# Patient Record
Sex: FEMALE
[De-identification: names, ages, dates, MRNs, and addresses within clinical notes are randomized per-mention and may not be internally consistent; named-entity substitution may affect disease eponyms.]

---

## 2017-05-08 ENCOUNTER — CHARTING TRANS (OUTPATIENT)
Dept: OTHER | Age: 32
End: 2017-05-08

## 2018-11-03 VITALS
OXYGEN SATURATION: 98 % | DIASTOLIC BLOOD PRESSURE: 64 MMHG | WEIGHT: 128.06 LBS | RESPIRATION RATE: 16 BRPM | HEIGHT: 62 IN | SYSTOLIC BLOOD PRESSURE: 102 MMHG | BODY MASS INDEX: 23.57 KG/M2 | TEMPERATURE: 98.1 F | HEART RATE: 78 BPM

## 2022-03-04 PROBLEM — F41.1 GAD (GENERALIZED ANXIETY DISORDER): Status: ACTIVE | Noted: 2022-03-04

## 2024-10-17 ENCOUNTER — WALK IN (OUTPATIENT)
Dept: URGENT CARE | Age: 39
End: 2024-10-17
Attending: EMERGENCY MEDICINE

## 2024-10-17 VITALS
RESPIRATION RATE: 16 BRPM | OXYGEN SATURATION: 96 % | DIASTOLIC BLOOD PRESSURE: 68 MMHG | HEART RATE: 86 BPM | SYSTOLIC BLOOD PRESSURE: 101 MMHG | TEMPERATURE: 98.2 F

## 2024-10-17 DIAGNOSIS — J02.9 PHARYNGITIS, UNSPECIFIED ETIOLOGY: Primary | ICD-10-CM

## 2024-10-17 RX ORDER — MINOXIDIL 2.5 MG/1
TABLET ORAL
COMMUNITY
Start: 2024-08-15

## 2024-10-17 RX ORDER — METHYLPREDNISOLONE 4 MG/1
4 TABLET ORAL SEE ADMIN INSTRUCTIONS
Qty: 21 TABLET | Refills: 0 | Status: SHIPPED | OUTPATIENT
Start: 2024-10-17

## 2024-10-17 ASSESSMENT — ENCOUNTER SYMPTOMS
SHORTNESS OF BREATH: 0
COUGH: 0
SORE THROAT: 1
FEVER: 0

## 2024-10-17 ASSESSMENT — PAIN SCALES - GENERAL: PAINLEVEL: 2

## 2025-01-15 ENCOUNTER — OFFICE VISIT (OUTPATIENT)
Dept: OTOLARYNGOLOGY | Facility: CLINIC | Age: 40
End: 2025-01-15

## 2025-01-15 DIAGNOSIS — J30.9 ALLERGIC RHINITIS, UNSPECIFIED SEASONALITY, UNSPECIFIED TRIGGER: Primary | ICD-10-CM

## 2025-01-15 DIAGNOSIS — J34.2 NASAL SEPTAL DEVIATION: ICD-10-CM

## 2025-01-15 PROCEDURE — 99203 OFFICE O/P NEW LOW 30 MIN: CPT | Performed by: OTOLARYNGOLOGY

## 2025-01-15 PROCEDURE — 31231 NASAL ENDOSCOPY DX: CPT | Performed by: OTOLARYNGOLOGY

## 2025-01-15 RX ORDER — FLUTICASONE PROPIONATE 50 MCG
2 SPRAY, SUSPENSION (ML) NASAL DAILY
Qty: 16 G | Refills: 3 | Status: SHIPPED | OUTPATIENT
Start: 2025-01-15

## 2025-01-15 NOTE — PROGRESS NOTES
NEW PATIENT PROGRESS NOTE  OTOLOGY/OTOLARYNGOLOGY    REF MD:  No referring provider defined for this encounter.    PCP: No primary care provider on file.    CHIEF COMPLAINT:    Chief Complaint   Patient presents with    Nose Problem     Evaluation on nasal passages, difficulty breathing        HISTORY OF PRESENT ILLNESS: Cleo Bullock is a 39 year old female who presents for evaluation of nasal issues. The patient reports difficulty breathing through her nose, particularly during exercise. 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 She notes that one side of her nose is more obstructed than the other, and she is unable to breathe through her nose when breathing rapidly. She also mentions frequent sneezing.       PAST MEDICAL HISTORY:    Past Medical History:    SEASONAL ALLERGIES       PAST SURGICAL HISTORY:  History reviewed. No pertinent surgical history.    Medications Ordered Prior to Encounter[1]    Allergies: Allergies[2]    SOCIAL HISTORY:    Social History     Tobacco Use    Smoking status: Never    Smokeless tobacco: Never   Substance Use Topics    Alcohol use: Yes       Family History   Problem Relation Age of Onset    Diabetes Father     Cancer Mother        REVIEW OF SYSTEMS:   Per HPI    EXAMINATION:  I washed my hands with an alcohol-based hand gel prior to examination  Constitutional:   --Vitals: There were no vitals taken for this visit.  General: no apparent distress, well-developed, conversant  Psych: affect pleasant and appropriate for age, alert and oriented  Neuro: Cranial nerves: EOMI, Facial sensation intact to touch, palate elevates midline, tongue protrudes midline, shoulder shrug intact bilateral, facial movement normal bilateral  Respiratory: No stridor, stertor or increased work of breathing  ENT:  --Nose: Slight bilateral internal nasal valve collapse, anterior rhinoscopy: Septum deviated to left, slight inferior turbinate hypertrophy, mucosa healthy,  no rhinorrhea  --OC/OP: No trismus. No masses or lesions noted over the gingiva, buccal mucosa, tongue, FOM, hard/soft palate, tonsillar pillars, posterior pharyngeal wall. Tonsils are asymmetric and soft. FOM/BOT are soft.   --Neck: No palpable cervical lymphadenopathy, no thyromegaly, no masses or lesions over the bilateral submandibular or parotid glands  --Ear: (bilateral ears were examined under binocular microscopy)  Right ear microscopic exam:  Pinna: Normal, no lesions or masses.  Mastoid: Nontender on palpation.   External auditory canal: Clear, no masses or lesions.  Tympanic membrane: Intact, no lesions, normal landmarks.  Middle ear: Aerated.    Left ear microscopic exam:  Pinna: Normal, no lesions or masses.  Mastoid: Nontender on palpation.   External auditory canal: Clear, no masses or lesions.  Tympanic membrane: Intact, no lesions, normal landmarks.  Middle ear: Aerated.    Nasal endoscopy (date 01/15/2025)  Verbal consent obtained, patient was correctly identified  Topical anesthesia with aerosolized lidocaine and neosynepherine spray in the bilateral nares  Findings:   Right: No mucous throughout. Edematous mucosa. Inferior turbinate is non-hypertrophic. Middle meatus is without polyps, purulence, masses. Middle turbinate is well formed and normal appearing. Sphenoethmoid recess is without polyps, purulence, masses.   Left: No mucous throughout. Edematous mucosa. Inferior turbinate is non-hypertrophic. Middle meatus is without polyps, purulence, masses. Middle turbinate is well formed and normal appearing. Sphenoethmoid recess is without polyps, purulence, masses.   Septum: leftward nasal septal deviation  Nasopharynx: No masses, bilateral eustachian tubes are patent. The bilateral fossae of Rosenmueller are clear. PND - clear, thin, on posterior pharyngeal wall    Flexible fiberoptic laryngoscopy (date 01/15/2025)  Verbal consent obtained, patient was correctly identified  Topical anesthesia with  aerosolized lidocaine and ephedrine spray in the bilateral nares  Findings: The flexible scope was advanced into the bilateral nares via the inferior meatus into the nasopharynx. No masses or lesions noted in the bilateral inferior meatus. The bilateral eustachian tubes are patent. No masses or lesions in the bilateral nasopharynx or fossae of Rosenmueller. No masses or lesions in the oropharynx, hypopharynx, supraglottis, glottis, subglottis. Normal TVF motion bilaterally in adduction and abduction.   Complications none, procedure well tolerated     ASSESSMENT/PLAN:  Cleo Bullock is a 39 year old female with     ICD-10-CM   1. Allergic rhinitis, unspecified seasonality, unspecified trigger  J30.9   2. Nasal septal deviation [J34.2]  J34.2        IMPRESSION:  Nasal obstruction with leftward septal deviation and possible internal nasal valve collapse - bilateral  Patient is recovering from flu, some degree of nasal mucosa edema may be attributed to this  Suspected allergic rhinitis     PLAN:  --Start flonase nasal spray, 2 sprays daily to each nostril, may take up to 6 weeks weeks of consistent use to take effect. Proper application discussed.   - Continue Nasal saline irrigations.  - Follow-up in 6-8 weeks for reevaluation.    Situation reviewed with the patient in detail.    Attention: This note has been scribed by Milagro Johnson under the supervision of Enrico Weber MD.     Enrico Weber MD  Otology/Otolaryngology  [address] [place] [address]  Phone [phone]  Fax [phone]      I have personally performed the services described in this documentation. All medical record entries made by the scribe were at my direction and in my presence. I have reviewed the chart and agree that the medical record reflects my personal performance and is accurate and complete.             [1]   Current Outpatient Medications on File Prior to  Visit   Medication Sig Dispense Refill    HYDROXYZINE 25 MG Oral Tab TAKE 1 TABLET (25 MG TOTAL) BY MOUTH 3 (THREE) TIMES DAILY AS NEEDED FOR ANXIETY (OR INSOMNIA). (Patient not taking: Reported on 1/15/2025) 90 tablet 0    FLUoxetine 20 MG Oral Cap Take 1 capsule (20 mg total) by mouth every morning. (Patient not taking: Reported on 1/15/2025) 90 capsule 1     No current facility-administered medications on file prior to visit.   [2] No Known Allergies

## 2025-03-21 ENCOUNTER — OFFICE VISIT (OUTPATIENT)
Facility: LOCATION | Age: 40
End: 2025-03-21
Payer: COMMERCIAL

## 2025-03-21 VITALS — WEIGHT: 141.63 LBS | BODY MASS INDEX: 26.06 KG/M2 | HEIGHT: 62 IN

## 2025-03-21 DIAGNOSIS — R09.81 NASAL CONGESTION: ICD-10-CM

## 2025-03-21 DIAGNOSIS — J30.9 ALLERGIC RHINITIS, UNSPECIFIED SEASONALITY, UNSPECIFIED TRIGGER: ICD-10-CM

## 2025-03-21 DIAGNOSIS — J34.2 NASAL SEPTAL DEVIATION: Primary | ICD-10-CM

## 2025-03-21 DIAGNOSIS — J34.829 NASAL VALVE COLLAPSE: ICD-10-CM

## 2025-03-21 PROCEDURE — 31231 NASAL ENDOSCOPY DX: CPT | Performed by: STUDENT IN AN ORGANIZED HEALTH CARE EDUCATION/TRAINING PROGRAM

## 2025-03-21 PROCEDURE — 99213 OFFICE O/P EST LOW 20 MIN: CPT | Performed by: STUDENT IN AN ORGANIZED HEALTH CARE EDUCATION/TRAINING PROGRAM

## 2025-03-21 RX ORDER — TRETINOIN 0.25 MG/G
CREAM TOPICAL
COMMUNITY

## 2025-03-21 RX ORDER — MINOXIDIL 2.5 MG/1
TABLET ORAL
COMMUNITY
Start: 2024-08-15

## 2025-03-21 RX ORDER — NITROGLYCERIN 0.4 MG/1
TABLET SUBLINGUAL
COMMUNITY

## 2025-03-21 RX ORDER — KETOCONAZOLE 20 MG/G
CREAM TOPICAL
COMMUNITY

## 2025-03-21 RX ORDER — TRETINOIN 0.5 MG/G
CREAM TOPICAL
COMMUNITY

## 2025-03-21 RX ORDER — BIMATOPROST 0.3 MG/ML
SOLUTION/ DROPS OPHTHALMIC
COMMUNITY

## 2025-03-21 RX ORDER — TRIAMCINOLONE ACETONIDE 0.25 MG/G
CREAM TOPICAL
COMMUNITY

## 2025-03-21 RX ORDER — OMEPRAZOLE 40 MG/1
40 CAPSULE, DELAYED RELEASE ORAL
COMMUNITY
Start: 2024-10-09

## 2025-03-21 RX ORDER — ESCITALOPRAM OXALATE 5 MG/1
TABLET ORAL
COMMUNITY

## 2025-03-21 NOTE — PROGRESS NOTES
[facility]  OTOLARYNGOLOGY - HEAD & NECK SURGERY    3/21/2025     Reason for Consultation:   Nasal congestion    History of Present Illness:   Patient is a pleasant 39 year old female who is being seen for nasal congestion which she states she feels more under exertion.  She finds herself mouth breathing quite frequently.  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  She was seen by Dr. Meadows previously and was recommended to try nasal spray for 6-week trial.  She also uses nasal saline rinses.  She states that this did not really help her breathing.  At rest she is able to breathe through her nose but whenever she has increased requirement she finds that she has to breathe through her mouth.  Does also noticed that externally her nasal ala collapses bilaterally.  Has not had any previous nasal or sinus surgery.  She is here for further recommendations for her nasal breathing.    Past Medical History  Past Medical History:    SEASONAL ALLERGIES       Past Surgical History  History reviewed. No pertinent surgical history.    Family History  Family History   Problem Relation Age of Onset    Diabetes Father     Cancer Mother        Social History  Pediatric History   Patient Parents    AnneEdwina (Mother)     Other Topics Concern    Not on file   Social History Narrative    Not on file           Current Medications:  Current Outpatient Medications   Medication Sig Dispense Refill    Levonorgestrel (MIRENA, 52 MG,) 20 MCG/DAY Intrauterine IUD Mirena 20 mcg/24 hours (6 yrs) 52 mg intrauterine device      fluticasone propionate 50 MCG/ACT Nasal Suspension 2 sprays by Nasal route daily. 16 g 3    Bimatoprost 0.03 % Ophthalmic Solution bimatoprost 0.03 % eye drops   APPLY TO TOP LID INTO BOTH EYES EVERY DAY AT BEDTIME      escitalopram 5 MG Oral Tab  (Patient not taking: Reported on 3/21/2025)      influenza vaccine split quad 0.5 mL  Intramuscular Suspension Prefilled Syringe Inject 50 mcg into the muscle As Directed.      influenza vaccine split quad 0.5 mL Intramuscular Suspension Prefilled Syringe Afluria Quad 8992-2075 (PF) 60 mcg (15 mcg x 4)/0.5 mL IM syringe   ADM 0.5ML IM UTD      ketoconazole 2 % External Cream       minoxidil 2.5 MG Oral Tab TAKE 1/4 TABLET BY MOUTH ONCE A DAY (Patient not taking: Reported on 3/21/2025)      nitroglycerin 0.4 MG Sublingual SL Tab DISSOLVE 1 TABLET UNDER THE TONGUE AS NEEDED FOR ANGINA (Patient not taking: Reported on 3/21/2025)      Omeprazole 40 MG Oral Capsule Delayed Release Take 1 capsule (40 mg total) by mouth every morning before breakfast. (Patient not taking: Reported on 3/21/2025)      tretinoin 0.025 % External Cream APPLY TO ENTIRE FACE AVOIDING EYELIDS NIGHTLY      Tretinoin 0.05 % External Cream APPLY A SMALL AMOUNT TO SKIN OF FACE NIGHTLY, AVOID EYELIDS.      triamcinolone 0.025 % External Cream       HYDROXYZINE 25 MG Oral Tab TAKE 1 TABLET (25 MG TOTAL) BY MOUTH 3 (THREE) TIMES DAILY AS NEEDED FOR ANXIETY (OR INSOMNIA). (Patient not taking: Reported on 3/21/2025) 90 tablet 0    FLUoxetine 20 MG Oral Cap Take 1 capsule (20 mg total) by mouth every morning. (Patient not taking: Reported on 3/21/2025) 90 capsule 1       Allergies  Allergies[1]    Review of Systems:   A comprehensive 10 point review of systems was completed.  Pertinent positives and negatives noted in the the HPI.    Physical Exam:   Height 5' 2\" (1.575 m), weight 141 lb 9.6 oz (64.2 kg).    GENERAL: No acute distress, Comfortable appearing  FACE: HB 1/6, Normal Animation  HEAD: Normocephalic  EYES: EOMI, pupils equil  EARS: Bilateral Auricles Symmetric, tympanic membranes with normal landmarks, well aerated middle ear space  NOSE: Nares patent bilaterally  ORAL CAVITY: Tongue mobile, Oropharynx clear, Floor of mouth clear, Posterior oropharynx normal  NECK: No palpable lymphadenopathy, thyroid not palpable,  nontender    PROCEDURE: BILATERAL RIGID NASAL ENDOSCOPY  Bilateral rigid nasal endoscopy (35749) was performed. Verbal consent was obtained from the patient to proceed with rigid nasal endoscopy.  A rigid 4mm 30 degree nasal endoscope was used to examine both nasal cavities. The inferior meatus, inferior turbinate, nasopharynx, middle meatus, middle turbinate, superior meatus, superior turbinate, and sphenoethmoidal recess were examined bilaterally and deemed to be normal, with any exceptions as noted below. At the completion of the procedure the endoscope was removed. The patient tolerated the procedure well. There were no complications.    Findings: The bilateral inferior turbinates were mildly edematous. The Septum was deviated to the left caudally but to the right posteriorly. The middle meatus was patent bilaterally without any purulent drainage. There were no obvious masses or polyps noted.  The patient does have nasal breathing which improves via the modified Haim maneuver      Results:     Laboratory Data:  No results found for: \"WBC\", \"HGB\", \"HCT\", \"PLT\", \"CREATSERUM\", \"BUN\", \"NA\", \"K\", \"CL\", \"CO2\", \"GLU\", \"CA\", \"ALB\", \"ALKPHO\", \"TP\", \"AST\", \"ALT\", \"PTT\", \"INR\", \"PTP\", \"T4F\", \"TSH\", \"TSHREFLEX\", \"DANIELLE\", \"LIP\", \"GGT\", \"PSA\", \"DDIMER\", \"ESRML\", \"ESRPF\", \"CRP\", \"BNP\", \"MG\", \"PHOS\", \"TROP\", \"CK\", \"CKMB\", \"DANIELLE\", \"RPR\", \"B12\", \"ETOH\", \"POCGLU\"      Imaging:  No results found.      Impression:       ICD-10-CM    1. Nasal septal deviation [J34.2]  J34.2       2. Nasal valve collapse  J34.829       3. Nasal congestion  R09.81       4. Allergic rhinitis, unspecified seasonality, unspecified trigger  J30.9            Recommendations:  We did discuss conservative versus aggressive management of her nasal valve collapse.  She is planning to undergo bilateral expansion to assist with her breathing.  We also discussed Latera implants which I would have to send her out for, or more aggressively open functional rhinoplasty  with  graft placement and columellar strut, as well as medial crural binding.  We discussed with her that we should remain conservative and she should undergo her palatal expansion and we will leave surgery for later if she continues to have issues with her breathing despite palatal expansion.    Thank you for allowing me to participate in the care of your patient.    Peterson Mascorro,    Otolaryngology/Rhinology, Sinus, and Endoscopic Skull Base Surgery  [address] [place] [id number]  Phone [phone]  Fax [phone]  3/21/2025  12:37 PM  3/21/2025          [1]   Allergies  Allergen Reactions    Other RASH and OTHER (SEE COMMENTS)     Adhesive tape    Sinus infections.    Adhesive Tape RASH    Wound Dressing Adhesive RASH    Skin Adhesives RASH

## 2025-04-23 ENCOUNTER — OFFICE VISIT (OUTPATIENT)
Facility: LOCATION | Age: 40
End: 2025-04-23
Payer: COMMERCIAL

## 2025-04-23 DIAGNOSIS — J34.2 NASAL SEPTAL DEVIATION: Primary | ICD-10-CM

## 2025-04-23 DIAGNOSIS — J34.3 NASAL TURBINATE HYPERTROPHY: ICD-10-CM

## 2025-04-23 PROCEDURE — 99214 OFFICE O/P EST MOD 30 MIN: CPT | Performed by: STUDENT IN AN ORGANIZED HEALTH CARE EDUCATION/TRAINING PROGRAM

## 2025-04-23 NOTE — PROGRESS NOTES
Cleo Bullock is a 39 year old female referred by Dr. Mascorro for evaluation of nasal septal deviation.   Chief Complaint   Patient presents with    Nose Problem     Consult on a septum repair/ nasal valve repair     HPI:   39 year old female presenting for evaluation of nasal septal deviation.  She reports right nasal obstruction despite maximal medical therapy with Flonase.  She denies recurrent sinusitis requiring antibiotics.  Patient is interested in exploring surgical options. 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     Current Medications[1]   Past Medical History[2]   Social History:  Short Social Hx on File[3]   Past Surgical History[4]      REVIEW OF SYSTEMS:   GENERAL HEALTH: feels well otherwise  GENERAL : denies fever, chills, sweats, weight loss, weight gain  SKIN: denies any unusual skin lesions or rashes  RESPIRATORY: denies shortness of breath with exertion  NEURO: denies headaches    EXAM:   There were no vitals taken for this visit.    System Findings Details   Constitutional  Overall appearance - Normal.   Head/Face  Facial features -- Normal. Skull - Normal.   Eyes  Sclera white, pupils equal and round, EOMI   Ears  External ears normal in appearance   Nose  External nose normal in appearance, nares patent, septum deviated to right, no epistaxis, bilateral inferior turbinate hypertrophy, minimal improvement with modified Knox maneuver on right, no improvement on left   Throat  Posterior pharynx clear, uvula midline, tonsils 1+   Oral cavity  Lips normal in appearance, mucous membranes clear, no masses, FOM soft, tongue without lesions   Neck  Trachea midline   Neurological  Memory - Normal. Cranial nerves - Cranial nerves II through XII grossly intact.       ASSESSMENT AND PLAN:   39 year old female presenting for evaluation of nasal septal deviation.  Patient has persistent nasal obstruction despite maximum medical therapy.    - Risks, benefits and alternatives of septoplasty, bilateral  inferior turbinate reduction were discussed including but not limited to pain, bleeding, infection, persistent nasal obstruction, septal perforation, teeth numbness and need for reoperation.  - Will submit for authorization and schedule accordingly    The patient indicates understanding of these issues and agrees to the plan.    Abbi Franks MD, LIONEL  Facial Plastic & Reconstructive Surgery, Otolaryngology-Head & Neck Surgery  [facility]    This note was prepared using Dragon [employer] voice recognition dictation software. As a result, errors may occur. When identified, these errors have been corrected. While every attempt is made to correct errors during dictation, discrepancies may still exist.          [1]   Current Outpatient Medications   Medication Sig Dispense Refill    Bimatoprost 0.03 % Ophthalmic Solution bimatoprost 0.03 % eye drops   APPLY TO TOP LID INTO BOTH EYES EVERY DAY AT BEDTIME      escitalopram 5 MG Oral Tab  (Patient not taking: Reported on 3/21/2025)      influenza vaccine split quad 0.5 mL Intramuscular Suspension Prefilled Syringe Inject 50 mcg into the muscle As Directed.      influenza vaccine split quad 0.5 mL Intramuscular Suspension Prefilled Syringe Afluria Quad 6496-0889 (PF) 60 mcg (15 mcg x 4)/0.5 mL IM syringe   ADM 0.5ML IM UTD      ketoconazole 2 % External Cream       Levonorgestrel (MIRENA, 52 MG,) 20 MCG/DAY Intrauterine IUD Mirena 20 mcg/24 hours (6 yrs) 52 mg intrauterine device      minoxidil 2.5 MG Oral Tab TAKE 1/4 TABLET BY MOUTH ONCE A DAY (Patient not taking: Reported on 3/21/2025)      nitroglycerin 0.4 MG Sublingual SL Tab DISSOLVE 1 TABLET UNDER THE TONGUE AS NEEDED FOR ANGINA (Patient not taking: Reported on 3/21/2025)      Omeprazole 40 MG Oral Capsule Delayed Release Take 1 capsule (40 mg total) by mouth every morning before breakfast. (Patient not taking: Reported on 3/21/2025)      tretinoin 0.025 % External Cream APPLY TO ENTIRE FACE AVOIDING EYELIDS  NIGHTLY      Tretinoin 0.05 % External Cream APPLY A SMALL AMOUNT TO SKIN OF FACE NIGHTLY, AVOID EYELIDS.      triamcinolone 0.025 % External Cream       fluticasone propionate 50 MCG/ACT Nasal Suspension 2 sprays by Nasal route daily. 16 g 3    HYDROXYZINE 25 MG Oral Tab TAKE 1 TABLET (25 MG TOTAL) BY MOUTH 3 (THREE) TIMES DAILY AS NEEDED FOR ANXIETY (OR INSOMNIA). (Patient not taking: Reported on 3/21/2025) 90 tablet 0    FLUoxetine 20 MG Oral Cap Take 1 capsule (20 mg total) by mouth every morning. (Patient not taking: Reported on 3/21/2025) 90 capsule 1   [2]   Past Medical History:   SEASONAL ALLERGIES   [3]   Social History  Socioeconomic History    Marital status:    Tobacco Use    Smoking status: Never    Smokeless tobacco: Never   Vaping Use    Vaping status: Never Used   Substance and Sexual Activity    Alcohol use: Yes    Drug use: No     Social Drivers of Health     Housing Stability: Not At Risk (9/25/2024)    Received from [facility]     What is your living situation today?: I have a steady place to live   [4] No past surgical history on file.

## 2025-04-29 ENCOUNTER — TELEPHONE (OUTPATIENT)
Facility: LOCATION | Age: 40
End: 2025-04-29